# Patient Record
(demographics unavailable — no encounter records)

---

## 2025-02-10 NOTE — ASSESSMENT
[FreeTextEntry1] : Data reviewed:  CT chest [facility] 9/16/21: 1. Interval resolution of multiple clustered nodular opacities within the posterior segment of the right upper lobe compatible with resolved small airways inflammation. 2. Persistent mild small and large airways inflammation. 3. Moderate centrilobular and substantial paraseptal emphysema. 4. Post right mastectomy. No evidence of hematogenous pulmonary metastasis.  Yong 2015: severe obstruction, FEV1 44%  Impression: Acute URI Severe COPD w emphysema By her report, incidental lung nodule on abdominal scanning  Plan: Given her severe COPD and at her insistence will give her a Z pack but this is likely viral. Cont Trelegy and albuterol prn. Agree w getting the lung scan as is ordered for her. Can see pulm at [facility] for this; if she wants to see me she will have to provide the images and report. Does not know the name of her primary. Phone time 11:21.

## 2025-02-10 NOTE — HISTORY OF PRESENT ILLNESS
[Former] : former [TextBox_4] : 11/20/2020: Asked to evaluate patient by Dr Villagran for dyspnea - last seen by DP in 2015. Dyspnea longstanding, says she has asthma, dyspneic walking outside for any distance. 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 Did have bad asthma as a child. Takes Wixela once a day and Proair. Never on a LAMA. Has gained weight and lost inches.  3/10/21: Here w daughter. Dyspnea on 1 block. She finds improvement on Trelegy - this helps her, but she's still dyspneic on one block. No interval exacerbations. Had first dose of Moderna.  9/23/21: Just got a Covid booster. Using Trelegy with Proair prn. No interval exacerbations or major new problems. Works from home. Walking is very limited. Doing some rehab exercises from [facility].  11/3/22: She did rehab at [facility] and this was helpful - the entire thing was at home and they gave her an iPad and pulse ox device. All of a sudden today has lost her voice. Just diagnosed w celiac disease and needs to modify her diet. Remains on Trelegy. Has been well, no exacerbations. Had bivalent Covid booster. Got the flu shot same time. Needs prevnar.  11/2/2023: Had flu vaccine and Covid booster and will get RSV. Has been ok. Walks for exercise. Has been diagnosed w celiac disease. Remains on Trelegy.  9/12/2024 [[place]]: Ms. Park is doing well since her last visit w/ us. She takes her trelegy although admits to not using it consistently. She does report SOB w/ stairs when taking subway and asking for letter for her access a ride application. Interested in taking her flu shot today (given). Will get RSV and covid shot at the pharmacy. She is taking metformin for prediabetes but she was on mounjaro prior and is going to ask for her endocrine to give that to her instead. Denies cough, hemotpysis or hospitalizations since her last visit w/ us. Did have covid a year ago but minimal symptoms.   2/10/2025: Follow up visit conducted by telehealth due to Covid pandemic. The patient gives consent for telehealth services, the patient and I are both in [facility], and the patient and I are the only participants on the call. Telephone only used per patient preference. Did not want to come out in the snow. Symptoms started w sore throat 2-3d ago, then cough, "I sound like a torres." The only thing that ever helps her is antibiotics. So she took one amoxicillin at home 2d ago. No fever. Feels dyspneic. Taking Trelegy daily.  Not wheezing much. Neighbor was sick w a bad cold. Sounds like she had an abdominal scan for an adrenal lesion, and had an incidental lung lesion. So now having a lung scan on 2/14/2025 and is going to follow up w pulm fellow there, Dr Cj Mondragon. [YearQuit] : 2008

## 2025-03-03 NOTE — ASSESSMENT
[FreeTextEntry1] : Data reviewed:  CT chest [facility] 2/2025 personally reviewed : emphysema, small HH, no suspicious nodule  Bailey 2015: severe obstruction, FEV1 44%  Impression: Severe COPD w emphysema Incidental lung nodule on abdominal scanning - no nodule on subsequent chest CT  Plan: Cont Trelegy and albuterol prn. Scan shows resolution of incidental nodule. Does not know the name of her primary again today. Wants me to give her Mounjaro - asked her to pls discuss w her primary, endocrine, and her GI who is the person who flagged a safety concern w Mounjaro. See her back in fall - flu vaccine at that time.

## 2025-03-03 NOTE — HISTORY OF PRESENT ILLNESS
[Former] : former [>= 20 pack years] : >= 20 pack years [TextBox_4] : My patient since 2020 w childhood asthma, smoker until 2008, severe COPD/emphysema on Trelegy. 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 Celiac disease.  2/10/2025: Follow up visit conducted by telehealth due to Covid pandemic. The patient gives consent for telehealth services, the patient and I are both in [facility], and the patient and I are the only participants on the call. Telephone only used per patient preference. Did not want to come out in the snow. Symptoms started w sore throat 2-3d ago, then cough, "I sound like a torres." The only thing that ever helps her is antibiotics. So she took one amoxicillin at home 2d ago. No fever. Feels dyspneic. Taking Trelegy daily.  Not wheezing much. Neighbor was sick w a bad cold. Sounds like she had an abdominal scan for an adrenal lesion, and had an incidental lung lesion. So now having a lung scan on 2/14/2025 and is going to follow up w pulm fellow there, Dr Cj Mondragon.  3/3/2025: Her chest infection resolved, still w some cough but otherwise ok. Taking Trelegy daily now after some time off due to forgetting to take it. Gaining weight. Would like to go back on Mounjaro. Her primary and endocrine are [place] and her GI is Dr Sanz here. He had some concern about her using Mounjaro. [YearQuit] : 2008